# Patient Record
Sex: FEMALE | Race: WHITE | NOT HISPANIC OR LATINO | URBAN - METROPOLITAN AREA
[De-identification: names, ages, dates, MRNs, and addresses within clinical notes are randomized per-mention and may not be internally consistent; named-entity substitution may affect disease eponyms.]

---

## 2018-06-06 ENCOUNTER — OFFICE (OUTPATIENT)
Dept: URBAN - METROPOLITAN AREA CLINIC 78 | Facility: CLINIC | Age: 58
End: 2018-06-06
Payer: MEDICAID

## 2018-06-06 VITALS
WEIGHT: 293 LBS | HEIGHT: 66 IN | SYSTOLIC BLOOD PRESSURE: 128 MMHG | HEART RATE: 80 BPM | TEMPERATURE: 98.2 F | DIASTOLIC BLOOD PRESSURE: 84 MMHG

## 2018-06-06 DIAGNOSIS — K59.09 OTHER CONSTIPATION: ICD-10-CM

## 2018-06-06 DIAGNOSIS — R10.11 RIGHT UPPER QUADRANT PAIN: ICD-10-CM

## 2018-06-06 DIAGNOSIS — Z83.79 FAMILY HISTORY OF OTHER DISEASES OF THE DIGESTIVE SYSTEM: ICD-10-CM

## 2018-06-06 DIAGNOSIS — R10.12 LEFT UPPER QUADRANT PAIN: ICD-10-CM

## 2018-06-06 DIAGNOSIS — K21.9 GASTRO-ESOPHAGEAL REFLUX DISEASE WITHOUT ESOPHAGITIS: ICD-10-CM

## 2018-06-06 PROCEDURE — 99204 OFFICE O/P NEW MOD 45 MIN: CPT

## 2021-02-18 NOTE — SERVICEHPINOTES
Ms. Jimenes is here to discuss constipation and back pain. Pain starts in her middle back and radiates into the bilateral upper quadrants. Pain is intermittent x 5 months. She notes more pain when she is constipated. She was very constipated and took Ex Lax which helped her to fully evacuate her bowels. She then felt very well for a few days. No blood in the stool. She has a daily BM but notes incomplete evacuation. She takes a fiber medication daily which she thinks helps. Not had a colonoscopy. She denies vomiting, dysphagia, early satiety. She does note abdominal bloating. No early satiety. She takes a daily baby ASA but no other NSAID use. She notes GERD more than 3 x per week. No nocturnal symptoms. Her sister had esophageal cancer and her brother had stomach cancer.  How Severe Is Your Skin Lesion?: mild Has Your Skin Lesion Been Treated?: not been treated Is This A New Presentation, Or A Follow-Up?: Skin Lesions Additional History: Pt states that the lesions started occurring when she moved here from [place].